# Patient Record
Sex: FEMALE | Race: WHITE | NOT HISPANIC OR LATINO | URBAN - METROPOLITAN AREA
[De-identification: names, ages, dates, MRNs, and addresses within clinical notes are randomized per-mention and may not be internally consistent; named-entity substitution may affect disease eponyms.]

---

## 2018-05-21 ENCOUNTER — TRANSFERRED RECORDS (OUTPATIENT)
Dept: HEALTH INFORMATION MANAGEMENT | Facility: CLINIC | Age: 73
End: 2018-05-21

## 2018-07-03 ENCOUNTER — TRANSFERRED RECORDS (OUTPATIENT)
Dept: HEALTH INFORMATION MANAGEMENT | Facility: CLINIC | Age: 73
End: 2018-07-03

## 2018-07-05 ENCOUNTER — TRANSFERRED RECORDS (OUTPATIENT)
Dept: HEALTH INFORMATION MANAGEMENT | Facility: CLINIC | Age: 73
End: 2018-07-05

## 2018-08-31 ENCOUNTER — TRANSFERRED RECORDS (OUTPATIENT)
Dept: HEALTH INFORMATION MANAGEMENT | Facility: CLINIC | Age: 73
End: 2018-08-31

## 2018-10-25 ENCOUNTER — TRANSFERRED RECORDS (OUTPATIENT)
Dept: HEALTH INFORMATION MANAGEMENT | Facility: CLINIC | Age: 73
End: 2018-10-25

## 2018-11-28 ENCOUNTER — TRANSFERRED RECORDS (OUTPATIENT)
Dept: HEALTH INFORMATION MANAGEMENT | Facility: CLINIC | Age: 73
End: 2018-11-28

## 2019-06-01 ENCOUNTER — MEDICAL CORRESPONDENCE (OUTPATIENT)
Dept: HEALTH INFORMATION MANAGEMENT | Facility: CLINIC | Age: 74
End: 2019-06-01

## 2019-06-07 ENCOUNTER — TRANSFERRED RECORDS (OUTPATIENT)
Dept: HEALTH INFORMATION MANAGEMENT | Facility: CLINIC | Age: 74
End: 2019-06-07

## 2019-06-12 ENCOUNTER — DOCUMENTATION ONLY (OUTPATIENT)
Dept: GASTROENTEROLOGY | Facility: CLINIC | Age: 74
End: 2019-06-12

## 2019-06-12 NOTE — PROGRESS NOTES
Received incoming fax from [facility] of medical records for referral to gastroenterology; have been scanned into chart and sent for review for appropriateness to schedule appointment.   person/place

## 2019-06-20 ENCOUNTER — DOCUMENTATION ONLY (OUTPATIENT)
Dept: GASTROENTEROLOGY | Facility: CLINIC | Age: 74
End: 2019-06-20

## 2019-06-20 NOTE — PROGRESS NOTES
Referring Provider and Clinic:  Lilia Cullen    Diagnosis:  Eosinophilic Esophagitis (2nd opinion)    GI notes or primary provider notes related to GI problem:   Y     Pathology Reports: Y    Recent Lab Reports: Y    Radiology Reports (CT/MRI) : MRI and US of Abdomen 9/2014    Endoscopy:  Y (waiting for colored images from [facility])    Colonoscopy: Y (waiting for colored images from [facility])        Referral Date: 6.10.19    Date complete records received and sent for review:     Date records scanned into epic:     Provider Review Date:     Date review routed back to sender:     Letter sent:     Notes:       Action Cindy Chen 6.25.19 1:00 pm   Action Taken Faxed [facility] to send colored images.

## 2019-08-06 ENCOUNTER — DOCUMENTATION ONLY (OUTPATIENT)
Dept: GASTROENTEROLOGY | Facility: CLINIC | Age: 74
End: 2019-08-06

## 2019-08-06 NOTE — PROGRESS NOTES
Referring Provider and Clinic:  Lilia Ceballos     Diagnosis:  Eosinophilic esophagitis    GI notes or primary provider notes related to GI problem: Y        Pathology Reports: N    Recent Lab Reports: Y    Radiology Reports (CT/MRI) : Y    Endoscopy:  Y    Colonoscopy: Y      Referral Date: 8/2/19    Date complete records received and sent for review:     Is this a second opinion from another GI physician? No    Automatic yes:     NA    Previous work up:    Labs  EGD  Colonoscopy  GI evaluation    Summary of pertinent GI work-up:    Dysphagia which developed after stopping PPI (? After longterm use).   EGD at [facility] July 2018 with ringed esophagus and Schatzki's ring with biopsies in distal esophagus with >50 eos. No mid-esophagus biopsies were taken.   Restarted on BID PPI.   Repeat EGD in Oct 2018. Mid-esophagus biopsies with no eosinophils. Distal esophagus biopsies with > 30 eos.   Recommendation for continued PPI for management of GERD symptoms and as patient reported improvement in dysphagia. Elimination diet vs budesonide recommended which patient declined.       Recommendation:    Schedule clinic appointment for ONE TIME SECOND OPINION VISIT in esophageal clinic with MD.       When to schedule:  Next available slot      Letter sent:     Notes: